# Patient Record
Sex: FEMALE | URBAN - METROPOLITAN AREA
[De-identification: names, ages, dates, MRNs, and addresses within clinical notes are randomized per-mention and may not be internally consistent; named-entity substitution may affect disease eponyms.]

---

## 2021-06-18 ENCOUNTER — PROCEDURE VISIT (OUTPATIENT)
Dept: PHYSICAL MEDICINE AND REHAB | Age: 56
End: 2021-06-18

## 2021-06-18 VITALS
HEART RATE: 81 BPM | DIASTOLIC BLOOD PRESSURE: 99 MMHG | BODY MASS INDEX: 35.97 KG/M2 | WEIGHT: 203 LBS | HEIGHT: 63 IN | SYSTOLIC BLOOD PRESSURE: 138 MMHG

## 2021-06-18 DIAGNOSIS — Z02.71 DISABILITY EXAMINATION: Primary | ICD-10-CM

## 2021-06-18 PROCEDURE — 99999 PR OFFICE/OUTPT VISIT,PROCEDURE ONLY: CPT | Performed by: PHYSICAL MEDICINE & REHABILITATION

## 2021-06-18 PROCEDURE — MISCBDD BUREAU OF DISABILITY DETERMINATION: Performed by: PHYSICAL MEDICINE & REHABILITATION

## 2021-06-18 RX ORDER — HYDROCODONE BITARTRATE AND ACETAMINOPHEN 5; 325 MG/1; MG/1
TABLET ORAL
COMMUNITY
Start: 2021-03-25

## 2021-06-18 RX ORDER — DULOXETIN HYDROCHLORIDE 60 MG/1
CAPSULE, DELAYED RELEASE ORAL
COMMUNITY
Start: 2021-05-31

## 2021-06-18 RX ORDER — CYCLOBENZAPRINE HCL 10 MG
10 TABLET ORAL 3 TIMES DAILY PRN
COMMUNITY

## 2021-06-18 RX ORDER — PREGABALIN 100 MG/1
150 CAPSULE ORAL 2 TIMES DAILY
COMMUNITY
Start: 2021-04-10

## 2021-06-18 NOTE — PROGRESS NOTES
Jimbo Mcdonough D.O. [facility]   [address] [facility] [address]  Phone: [phone]  Fax: [phone]      2021    Cecilia Rivero  : 1965    Cecilia Rivero was seen in my office today for a Disability Determination Examination. The history was obtained from the claimant who was felt to be reliable. The claimant was identified by photo identification. I explained to the claimant that this examination was for evaluation purposes only and that no physician-patient relationship exists. The claimant expressed understanding and agreement. A release of information was signed and placed in the chart. I advised the claimant not to cause bodily harm or significant pain with any of the requested activities    Cecilia Rivero is a right hand dominant woman who reports to be disabled due to fibromyalgia. She was diagnosed in 2017 with fibromyalgia after no injury. She was having widespread pain, fatigue, depressed mood and sleep disturbances. The work up has included: EMG. Symptoms have been getting worse since onset. Currently, the pain is located in the upper and lower body bilaterally and does not radiate. The pain is described as burning, aching and rated as Pain Score:   9. The pain is constant and occurs daily. The pain is worse with vacuuming, housework and better with rest.  Associated symptoms include stiffenss. Prior treatment: Effective medications have included Cymbalta, Lyrica, Flexeril, Norco.  Ineffective medications have included none.      Records Reviewed   Progress Notes:  Jaydon Lopes MD; 21    Treatment YES/NO Effective/Ineffective   Therapy Yes Yes   Surgery No    Injection No    Electric stimulation No    Massage No    Ultrasound No    Heat Yes Yes   Ice No    Chiropractic  No    Formal pain treatment program Yes Yes     Past Medical History:   Diagnosis Date    Cervical disc disorder     Fibromyalgia  Hypertension     Lumbosacral disc disease     Osteoarthritis     Trigger finger     Urinary frequency     Urinary incontinence      Past Surgical History:   Procedure Laterality Date    CYST REMOVAL      right wrist    US DRAINAGE OF RECTAL ABSCESS       Social History     Tobacco Use    Smoking status: Never Smoker    Smokeless tobacco: Never Used   Vaping Use    Vaping Use: Every day    Substances: CBD, Flavoring    Devices: Refillable tank, medical marijuana gummies   Substance Use Topics    Alcohol use: Yes     Alcohol/week: 1.0 standard drinks     Types: 1 Cans of beer per week     Comment: occasional     Drug use: Yes     Frequency: 7.0 times per week     Types: Marijuana     Comment: daily user     The claimant has completed high school education. 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 The claimant does require the use of an assistive device including cane, soft cervical collar, pillow, walking boot. She requires assistance for cleaning  Monique Lyons is otherwise not limited in activities of daily living including self care tasks of feeding, grooming, dressing, bathing, cooking and mobility tasks of getting out of bed, rising from chair, walking, balancing, going up and down steps. Monique Lyons  feels his ADL's are limited due to pain and requires increased time  The claimant reports sitting tolerance of 2-3 hours,  standing tolerance of 30, and the ability to lift 30 pounds. The claimant does not perform regular exercise. The claimant reports difficulty walking fast or long distances.      Family History   Problem Relation Age of Onset    Arthritis Mother     Hypertension Mother     Diabetes Father     Clotting Disorder Father     Heart Disease Father      Allergies   Allergen Reactions    Shields Shortness Of Breath    Cortisone Other (See Comments)     Memory loss no recall after getting medication    Prednisone Nausea Only     Current Outpatient Medications   Medication Sig Dispense Refill    DULoxetine (CYMBALTA) 60 MG extended release capsule TAKE 1 CAPSULE BY MOUTH TWICE DAILY      pregabalin (LYRICA) 100 MG capsule 150 mg 2 times daily.  cyclobenzaprine (FLEXERIL) 10 MG tablet Take 10 mg by mouth 3 times daily as needed for Muscle spasms      Triamterene-HCTZ (MAXZIDE-25 PO) Take 1 tablet by mouth daily      medical marijuana Take by mouth as needed.  HYDROcodone-acetaminophen (NORCO) 5-325 MG per tablet TAKE 1 TABLET BY MOUTH ONCE DAILY AS NEEDED (Patient not taking: Reported on 6/18/2021)       No current facility-administered medications for this visit. Review of Systems - For review of systems, positive symptoms are underlined and negative findings are not underlined. General: chills, fatigue, fever, malaise, night sweats, weight gain,  weight loss. Psychological: anxiety, depression, suicidal ideation, sleep disturbances, behavioral disorder, difficulty concentrating, disorientation, hallucinations, mood swings, obsessive thoughts, physical abuse,  sexual abuse. Ophthalmic: blurry vision, decreased vision, double vision, loss of vision, photophobia, use of corrective device. Ear Nose Throat: hearing loss, tinnitus, phonophobia, sensitivity to smells, vertigo, or vocal changes. Allergy/Immunology: seasonal allergies, watery eyes, itchy eyes, frequent infections. Hematological and Lymphatic: bleeding problems, blood clots, bruising,  yellowing of the skin, swollen lymph nodes. Endocrine:  polydypsia, polyuria, temperature intolerance. Respiratory: cough, shortness of breath, wheezing. Cardiovascular: syncope, chest pain, dyspnea on exertion, edema, irregular heartbeat,  palpitations. Gastrointestinal: abdominal pain, constipation, diarrhea,  decreased appetite, heartburn, hematemesis, melena, nausea, vomiting, stool incontinence, abnormal swallowing. Genito-Urinary: dysuria, hematuria, incontinence, frequency, urgency. Musculoskeletal: joint pain, stiffness, swelling, muscle pain, muscle  tenderness. Neurological: confusion, memory loss, dizziness, gait disturbance, headaches, impaired coordination, decreased balance, numbness/tingling, seizures, speech problems, tremors,weakness. Dermatological:  hair changes, nail changes, pruritus, rash. PHYSICAL EXAMINATION: Blood pressure (!) 138/99, pulse 81, height 5' 3\" (1.6 m), weight 203 lb (92.1 kg). The claimant was cooperative with the examination. Please see the neuro-musculoskeletal data sheet for more details of the physical examination. General: No apparent distress. Appears of average intellect. Behavior was appropriate. Able to relate. Personal appearance was well groomed. Psychosocial: Mood and affect were appropriate. HEENT: Snellen eye chart 20/20 left, 20/20 on right with glasses  No palpable cervical lymph nodes. Anicteric. No conjunctival injection. Mucosa moist and pink. Cardiovascular: Regular Rate and Rhythm. No peripheral edema. Peripheral pulses 2+ at dorsalis pedis and radial arteries. Pulmonary: Unlabored and Regular Abdomen: Soft, non-tender. No abdominal bruit or pulsatile mass. Skin: Normal turgor without wound or rash. Musculoskeletal: Spurling negative bilaterally. Straight leg raise negative bilaterally. FM tender points 16/18. Otherwise, there was no crepitus, pain with ROM maneuvers, warmth, edema, effusion, erythema, tenderness to palpation, synovial thickening,  deformity including contracture, bony enlargement,varus/valgus deformity, ulnar deviation or joint instability or ligamentous laxity. Neurologic:  Awake, alert, and oriented to person place and time. Speech is fluent. Hearing is intact for conversation. Sensation was intact for temperature, light touch, vibration, proprioception. Coordination was intact in the upper extremities for finger to nose and in the lower extremities for heel to shin. Rapid alternating movements were intact in the upper and lower extremities. Muscle tendon reflexes were 2+ and symmetric at the biceps, triceps, brachioradialis, patella and achilles. Romberg was negative. Heel and toe walking were intact. Gait was normal. There was not a visible limp. It is  safe for the claimant to walk without a hand-held assistive device. The claimant is able to walk both short and long distances on level and on uneven surfaces. Functional status: The claimant was able to sit/stand/walk without an assistive device independently. Gaudencio Robles  was able to dress independently and reach overhead. The claimant was able to write, shake hands and perform fine motor movements. Impression: This is a 63y.o. year old claimant with   1. Fibromyalgia  2. Obesity  3. Cervical and lumbar disc disease  4. Marijuana abuse  5. Hypertension  6. Incontinence  7. Mood disorder    There are not significant medically determinable physical impairments. If awarded benefits the claimant would be able to manage them. The claimant does report psychiatric conditions as a cause of disability. A psychiatric consultative examination is  recommended to further evaluate. The claimant was recommended to follow up with a primary care physician regarding hypertension. The above analysis is based upon the available information at the time of this examination including the history given by the claimant,  the medical records and tests reviewed and the physical findings. It is assumed that the material provided is correct. Any medical recommendations offered are provided as guidance and not as medical orders. I have not provided care for this claimaint. I have seen the claimaint one time on 6/18/2021 , for the purpose of a disability determination. The opinions expressed are based solely on the information provided to me and on the history and medical examination performed on 6/18/2021.       Respectfully